# Patient Record
Sex: MALE | Race: WHITE | URBAN - METROPOLITAN AREA
[De-identification: names, ages, dates, MRNs, and addresses within clinical notes are randomized per-mention and may not be internally consistent; named-entity substitution may affect disease eponyms.]

---

## 2017-08-21 ENCOUNTER — HOSPITAL ENCOUNTER (EMERGENCY)
Facility: CLINIC | Age: 47
Discharge: HOME | End: 2017-08-21
Attending: NURSE PRACTITIONER | Admitting: NURSE PRACTITIONER

## 2017-08-21 VITALS
HEIGHT: 67 IN | RESPIRATION RATE: 16 BRPM | BODY MASS INDEX: 25.11 KG/M2 | TEMPERATURE: 98.3 F | OXYGEN SATURATION: 97 % | WEIGHT: 160 LBS | HEART RATE: 77 BPM | DIASTOLIC BLOOD PRESSURE: 80 MMHG | SYSTOLIC BLOOD PRESSURE: 127 MMHG

## 2017-08-21 DIAGNOSIS — T78.40XA ALLERGIC REACTION, INITIAL ENCOUNTER: ICD-10-CM

## 2017-08-21 DIAGNOSIS — R21 RASH: ICD-10-CM

## 2017-08-21 PROCEDURE — 99213 OFFICE O/P EST LOW 20 MIN: CPT | Performed by: NURSE PRACTITIONER

## 2017-08-21 PROCEDURE — 99213 OFFICE O/P EST LOW 20 MIN: CPT

## 2017-08-21 RX ORDER — PREDNISONE 10 MG/1
TABLET ORAL
Qty: 32 TABLET | Refills: 0 | Status: SHIPPED | OUTPATIENT
Start: 2017-08-21 | End: 2017-08-31

## 2017-08-21 NOTE — ED PROVIDER NOTES
"  History     Chief Complaint   Patient presents with     Rash      4 weeks      Otalgia     HPI  Stiven Kim is a 47 year old male who presents to urgent care for evaluation of rash that started on his arms and legs 4 weeks ago after he was in a barn and handling hay.  He treated with Benadryl and slightly improved. He went into the barn again a couple weeks ago and could feel the rash starting, burning and itching on his skin. Rash became widespread.  He again took benadryl with little relief. He took 4 days of Prednisone that his girlfriend had left over from a prescription she had and he started to feel better, but the symptoms started after he stopped the Prednisone. Denies SOA. Denies wheezing. Denies any GI symptoms. Denies fever.     I have reviewed the Medications, Allergies, Past Medical and Surgical History, and Social History in the Epic system.    Allergies: No Known Allergies      No current facility-administered medications on file prior to encounter.   No current outpatient prescriptions on file prior to encounter.    There is no problem list on file for this patient.      No past surgical history on file.    Social History   Substance Use Topics     Smoking status: Current Every Day Smoker     Smokeless tobacco: Not on file     Alcohol use Not on file         There is no immunization history on file for this patient.    BMI: Estimated body mass index is 25.06 kg/(m^2) as calculated from the following:    Height as of this encounter: 1.702 m (5' 7\").    Weight as of this encounter: 72.6 kg (160 lb).      Review of Systems  As mentioned above in the history present illness. All other systems were reviewed and are negative.    Physical Exam   BP: 127/80  Pulse: 77  Temp: 98.3  F (36.8  C)  Resp: 16  Height: 170.2 cm (5' 7\")  Weight: 72.6 kg (160 lb)  SpO2: 97 %  Physical Exam    GENERAL APPEARANCE: healthy, alert and no distress  EYES: EOMI,  PERRL, conjunctiva clear  HENT: ear canals and TM's " with bilateral middle ear effusions, no erythema.  Nose and mouth without ulcers, erythema or lesions  NECK: supple, nontender, no lymphadenopathy  RESP: lungs clear to auscultation - no rales, rhonchi or wheezes  CV: regular rates and rhythm, normal S1 S2, no murmur noted  SKIN: erythematous maculopapular rash on arms, legs and trunk. Pruritic.     ED Course     ED Course     Procedures           Labs Ordered and Resulted from Time of ED Arrival Up to the Time of Departure from the ED - No data to display    Assessments & Plan (with Medical Decision Making)   Discussed allergic reaction- trigger avoidance and treatment with patient. Prescription for a 2 week Prednisone taper sent to [facility]. Instructed to start Cetrizine daily as well. Follow-up with regular doctor if not improving in 72 hours.    I have reviewed the nursing notes.    I have reviewed the findings, diagnosis, plan and need for follow up with the patient.      Discharge Medication List as of 8/21/2017 12:40 PM      START taking these medications    Details   predniSONE (DELTASONE) 10 MG tablet Take 4 tablets daily for 5 days,  take 2 tablets daily for 3 days, take 1 tablet daily for 3 days, take half a tablet for 3 days., Disp-32 tablet, R-0, E-Prescribe             Final diagnoses:   Rash   Allergic reaction, initial encounter       8/21/2017   [facility] EMERGENCY DEPARTMENT     Jessica Nichole APRN CNP  08/21/17 2890

## 2017-08-21 NOTE — DISCHARGE INSTRUCTIONS
Zyrtec (ceterizine) 10mg daily.  Prednisone taper as directed.   Generalized Allergic Reaction (Other)  You are having an allergic reaction. Almost anything can cause one. Different people are allergic to different things. It is usually something that you ate or swallowed, came into contact with by getting or putting it on your skin or clothes, or something you breathed in the air. This can be very annoying and sometimes scary.  Most of us think of allergic reactions when we have a rash or itchy skin. Symptoms can include:    Rash, hives, redness, welts, blisters    Itching, burning, stinging, pain    Dry, flaky, cracking, scaly skin    Swelling of the face, lips or other parts of the body    Hoarse voice    Trouble swallowing, feeling like your throat is closing    Trouble breathing, wheezing    Nausea, vomiting, diarrhea, stomach cramps    Feeling faint or lightheaded, rapid heart rate  Sometimes the cause may be obvious. However, there are so many things that can cause a reaction that you may not be able to figure out. The most important things to help find your allergen are:    Remembering when it started    What you were doing at the time or just before that    Any activities you were involved in    Any new products or contacts  Here are some common causes, but remember almost anything can cause a reaction, and you may not even be aware that you came into contact with one of these things.    Dust, mold, pollen    Plants, such aspoison ivy and poison oak are common ones, but there are many others    Animals    Foods such as shrimp, shellfish, peanuts, milk products, gluten, eggs; also colorings, flavorings, additives    Insect bites or stings such as bees, mosquitos, flees, ticks    Medicines such as penicillin, sulfa drugs, amoxicillin, aspirin, ibuprofen; any medicine can cause a reaction    Jewelry such as nickel, gold (new, or something you ve worn for a while including zippers, and buttons)    Latex such  as in gloves, clothes, toys, balloons, or some tapes (some people allergic to latex may also have problems with foods like bananas, avocados, kiwi, papaya, or chestnuts)    Lotions, perfumes, cosmetics, soaps, shampoos, skincare products, nail products    Chemicals or dyes in clothing, linen, , hair dyes, soaps, iodine  Many viruses and common colds can cause a rash, but is not an allergic reaction. Sometimes it is hard to tell the difference between allergies, sensitivity and intolerance to something. This is especially true with food. Many things can cause diarrhea, vomiting, stomach cramps, and skin irritation.  Home care    The goal of our treatment is to help relieve the symptoms, and get you feeling better. The rash will usually fade over several days, but can sometimes last a couple of weeks. Over the next couple of days, there may be times when it is gets a little worse, and then better again. Here are some things to do:    If you know what you are allergic to, avoid it because future reactions could be worse than this one.    Avoid tight clothing and anything that heats up your skin (hot showers/baths, direct sunlight) since heat will make itching worse.    An ice pack will relieve local areas of intense itching and redness. Don t put the ice directly on the skin, because it can damage the skin. You can also ice put it in a plastic bag. Wrap it in something like a thin towel, mignon shirt, or cloth, or use a bag of frozen peas.    Oral Benadryl (diphenhydramine) is an antihistamine available at drug and grocery stores. Unless a prescription antihistamine was given, Benadryl may be used to reduce itching if large areas of the skin are involved. It may make you sleepy, so be careful using it in the daytime or when going to school, working, or driving. [NOTE: Do not use Benadryl if you have glaucoma or if you are a man with trouble urinating due to an enlarged prostate.] There are antihistamines that  causes less drowsiness and are good alternatives for daytime use. Ask your pharmacist for suggestions.    Do not use Benadryl cream on your skin, because in some people it can cause a further reaction, and make you allergic to Benadryl.    Try not to scratch. This can tear the skin and cause an infection.    Using heat-steam to clean your home, using high-efficiency particulate (HEPA) vacuums and filters, avoiding food and pet triggers, exterminating cockroaches, and frequent house cleaning are a few of the strategies used to decrease allergic reactions.  Follow-up care  Follow up with your healthcare provider, or as advised. If you had a severe reaction today, or if you have had several mild-moderate allergic reactions in the past, ask your doctor about allergy testing to find out what you are allergic to. If your reaction included dizziness, fainting or trouble breathing or swallowing, ask your doctor about carrying injectable epinephrine for home use.  Call 911  Call 911 if any of these occur:    Trouble breathing or swallowing, wheezing    Hoarse voice or trouble speaking    Confused    Very drowsy or trouble awakening    Fainting or loss of consciousness    Rapid heart rate    Low blood pressure    Feeling of doom    Nausea, vomiting, abdominal pain, diarrhea    Vomiting blood, or large amounts of blood in stool    Seizure  When to seek medical advice  Call your healthcare provider right away if any of these occur:    Spreading areas of itching, redness or swelling    New or worse swelling in the face, eyelids, lips, mouth, throat or tongue    Dizziness, weakness    Signs of infection:    Spreading redness    Increased pain or swelling    Fever (1 degree above your normal temperature) lasting for 24 to 48 hours  Date Last Reviewed: 7/30/2015 2000-2017 [employer]. [address] [place] [address], [place] [address]. All rights reserved. This information is not intended as a substitute for professional  medical care. Always follow your healthcare professional's instructions.

## 2017-08-21 NOTE — ED AVS SNAPSHOT
[facility]    [address] [facility] [address]    Phone:  [phone]    Fax:  [phone]                                       Stiven Kim   MRN: [id number]    Department:  [facility]   Date of Visit:  8/21/2017           Patient Information     Date Of Birth          1970        Your diagnoses for this visit were:     Rash     Allergic reaction, initial encounter        You were seen by Jessica Nichole APRN CNP.      Follow-up Information     Schedule an appointment as soon as possible for a visit with regular doctor .    Why:  if not improving.        Discharge Instructions         Zyrtec (ceterizine) 10mg daily.  Prednisone taper as directed.   Generalized Allergic Reaction (Other)  You are having an allergic reaction. Almost anything can cause one. Different people are allergic to different things. It is usually something that you ate or swallowed, came into contact with by getting or putting it on your skin or clothes, or something you breathed in the air. This can be very annoying and sometimes scary.  Most of us think of allergic reactions when we have a rash or itchy skin. Symptoms can include:    Rash, hives, redness, welts, blisters    Itching, burning, stinging, pain    Dry, flaky, cracking, scaly skin    Swelling of the face, lips or other parts of the body    Hoarse voice    Trouble swallowing, feeling like your throat is closing    Trouble breathing, wheezing    Nausea, vomiting, diarrhea, stomach cramps    Feeling faint or lightheaded, rapid heart rate  Sometimes the cause may be obvious. However, there are so many things that can cause a reaction that you may not be able to figure out. The most important things to help find your allergen are:    Remembering when it started    What you were doing at the time or just before that    Any activities you were involved in    Any new products or contacts  Here are some  common causes, but remember almost anything can cause a reaction, and you may not even be aware that you came into contact with one of these things.    Dust, mold, pollen    Plants, such aspoison ivy and poison oak are common ones, but there are many others    Animals    Foods such as shrimp, shellfish, peanuts, milk products, gluten, eggs; also colorings, flavorings, additives    Insect bites or stings such as bees, mosquitos, flees, ticks    Medicines such as penicillin, sulfa drugs, amoxicillin, aspirin, ibuprofen; any medicine can cause a reaction    Jewelry such as nickel, gold (new, or something you ve worn for a while including zippers, and buttons)    Latex such as in gloves, clothes, toys, balloons, or some tapes (some people allergic to latex may also have problems with foods like bananas, avocados, kiwi, papaya, or chestnuts)    Lotions, perfumes, cosmetics, soaps, shampoos, skincare products, nail products    Chemicals or dyes in clothing, linen, , hair dyes, soaps, iodine  Many viruses and common colds can cause a rash, but is not an allergic reaction. Sometimes it is hard to tell the difference between allergies, sensitivity and intolerance to something. This is especially true with food. Many things can cause diarrhea, vomiting, stomach cramps, and skin irritation.  Home care    The goal of our treatment is to help relieve the symptoms, and get you feeling better. The rash will usually fade over several days, but can sometimes last a couple of weeks. Over the next couple of days, there may be times when it is gets a little worse, and then better again. Here are some things to do:    If you know what you are allergic to, avoid it because future reactions could be worse than this one.    Avoid tight clothing and anything that heats up your skin (hot showers/baths, direct sunlight) since heat will make itching worse.    An ice pack will relieve local areas of intense itching and redness. Don t  put the ice directly on the skin, because it can damage the skin. You can also ice put it in a plastic bag. Wrap it in something like a thin towel, mignon shirt, or cloth, or use a bag of frozen peas.    Oral Benadryl (diphenhydramine) is an antihistamine available at drug and grocery stores. Unless a prescription antihistamine was given, Benadryl may be used to reduce itching if large areas of the skin are involved. It may make you sleepy, so be careful using it in the daytime or when going to school, working, or driving. [NOTE: Do not use Benadryl if you have glaucoma or if you are a man with trouble urinating due to an enlarged prostate.] There are antihistamines that causes less drowsiness and are good alternatives for daytime use. Ask your pharmacist for suggestions.    Do not use Benadryl cream on your skin, because in some people it can cause a further reaction, and make you allergic to Benadryl.    Try not to scratch. This can tear the skin and cause an infection.    Using heat-steam to clean your home, using high-efficiency particulate (HEPA) vacuums and filters, avoiding food and pet triggers, exterminating cockroaches, and frequent house cleaning are a few of the strategies used to decrease allergic reactions.  Follow-up care  Follow up with your healthcare provider, or as advised. If you had a severe reaction today, or if you have had several mild-moderate allergic reactions in the past, ask your doctor about allergy testing to find out what you are allergic to. If your reaction included dizziness, fainting or trouble breathing or swallowing, ask your doctor about carrying injectable epinephrine for home use.  Call 911  Call 911 if any of these occur:    Trouble breathing or swallowing, wheezing    Hoarse voice or trouble speaking    Confused    Very drowsy or trouble awakening    Fainting or loss of consciousness    Rapid heart rate    Low blood pressure    Feeling of doom    Nausea, vomiting, abdominal  pain, diarrhea    Vomiting blood, or large amounts of blood in stool    Seizure  When to seek medical advice  Call your healthcare provider right away if any of these occur:    Spreading areas of itching, redness or swelling    New or worse swelling in the face, eyelids, lips, mouth, throat or tongue    Dizziness, weakness    Signs of infection:    Spreading redness    Increased pain or swelling    Fever (1 degree above your normal temperature) lasting for 24 to 48 hours  Date Last Reviewed: 7/30/2015 2000-2017 [employer]. [address] [place] [address]. All rights reserved. This information is not intended as a substitute for professional medical care. Always follow your healthcare professional's instructions.          24 Hour Appointment Hotline       To make an appointment at any [facility], call [phone] ([phone]). If you don't have a family doctor or clinic, we will help you find one. [facility] are conveniently located to serve the needs of you and your family.             Review of your medicines      START taking        Dose / Directions Last dose taken    predniSONE 10 MG tablet   Commonly known as:  DELTASONE   Quantity:  32 tablet        Take 4 tablets daily for 5 days,  take 2 tablets daily for 3 days, take 1 tablet daily for 3 days, take half a tablet for 3 days.   Refills:  0                Prescriptions were sent or printed at these locations (1 Prescription)                   [facility] [address] [place] [address]   [address] [facility] [address]    Telephone:  [phone]   Fax:  [phone]   Hours:                  E-Prescribed (1 of 1)         predniSONE (DELTASONE) 10 MG tablet                Orders Needing Specimen Collection     None      Pending Results     No orders found from 8/19/2017 to 8/22/2017.            Pending Culture Results     No orders found from 8/19/2017 to 8/22/2017.            Pending Results  "Instructions     If you had any lab results that were not finalized at the time of your Discharge, you can call the ED Lab Result RN at [phone]. You will be contacted by this team for any positive Lab results or changes in treatment. The nurses are available 7 days a week from 10A to 6:30P.  You can leave a message 24 hours per day and they will return your call.        Test Results From Your Hospital Stay               Thank you for choosing [facility]       Thank you for choosing [facility] for your care. Our goal is always to provide you with excellent care. Hearing back from our patients is one way we can continue to improve our services. Please take a few minutes to complete the written survey that you may receive in the mail after you visit with us. Thank you!        [employer] Information     [employer] lets you send messages to your doctor, view your test results, renew your prescriptions, schedule appointments and more. To sign up, go to [email or web address] . Click on \"Log in\" on the left side of the screen, which will take you to the Welcome page. Then click on \"Sign up Now\" on the right side of the page.     You will be asked to enter the access code listed below, as well as some personal information. Please follow the directions to create your username and password.     Your access code is: [email or web address]  Expires: 2017 12:40 PM     Your access code will  in 90 days. If you need help or a new code, please call your [facility] clinic or [phone].        Care EveryWhere ID     This is your Care EveryWhere ID. This could be used by other organizations to access your [facility] medical records  [id number]        Equal Access to Services     MONTEZ LEA : Hadii sonal Fitch, alexandra mahmood, sterling chu. So [facility] [phone].    ATENCIÓN: Si habla español, tiene a smith disposición servicios gratuitos de asistencia lingüística. Llame " al [phone].    We comply with applicable federal civil rights laws and [place] laws. We do not discriminate on the basis of race, color, national origin, age, disability sex, sexual orientation or gender identity.            After Visit Summary       This is your record. Keep this with you and show to your community pharmacist(s) and doctor(s) at your next visit.

## 2017-08-21 NOTE — ED AVS SNAPSHOT
[facility]    [address] [facility] [address]    Phone:  [phone]    Fax:  [phone]                                       Stiven Kim   MRN: [id number]    Department:  [facility]   Date of Visit:  8/21/2017           After Visit Summary Signature Page     I have received my discharge instructions, and my questions have been answered. I have discussed any challenges I see with this plan with the nurse or doctor.    ..........................................................................................................................................  Patient/Patient Representative Signature      ..........................................................................................................................................  Patient Representative Print Name and Relationship to Patient    ..................................................               ................................................  Date                                            Time    ..........................................................................................................................................  Reviewed by Signature/Title    ...................................................              ..............................................  Date                                                            Time